# Patient Record
Sex: FEMALE | Race: WHITE
[De-identification: names, ages, dates, MRNs, and addresses within clinical notes are randomized per-mention and may not be internally consistent; named-entity substitution may affect disease eponyms.]

---

## 2018-10-30 ENCOUNTER — HOSPITAL ENCOUNTER (OUTPATIENT)
Dept: HOSPITAL 92 - BICMAMMO | Age: 68
Discharge: HOME | End: 2018-10-30
Attending: FAMILY MEDICINE
Payer: MEDICARE

## 2018-10-30 DIAGNOSIS — R92.8: Primary | ICD-10-CM

## 2018-10-30 DIAGNOSIS — Z80.3: ICD-10-CM

## 2018-10-30 PROCEDURE — 77066 DX MAMMO INCL CAD BI: CPT

## 2018-10-30 PROCEDURE — 76642 ULTRASOUND BREAST LIMITED: CPT

## 2018-10-30 PROCEDURE — G0279 TOMOSYNTHESIS, MAMMO: HCPCS

## 2018-10-30 NOTE — ULT
LIMITED LEFT BREAST ULTRASOUND:

 

Date:  10/30/18 

 

PROVIDED CLINICAL HISTORY:   

Abnormal mammogram. 

 

FINDINGS:

Limited sonographic interrogation was performed of the left breast in the region of mammographic conc
kristian. Multiple simples cysts are seen in addition to occasional debris-filled cysts. At the 9 o'clock 
position of the left breast, far posteriorly in the region of the chest wall, is a poorly defined, hy
poechoic mass with associated shadowing measuring about 1.0 cm. This is felt to correspond to the [facility]
mogram finding. 

 

IMPRESSION: 

 

BIRADS 5:  Highly Suggestive of Malignancy

 

Ultrasound guided biopsy is recommended. 

 

Results and recommendations discussed with the patient, who voiced understanding. 

 

 

POS: DIANNA

## 2018-11-01 ENCOUNTER — HOSPITAL ENCOUNTER (OUTPATIENT)
Dept: HOSPITAL 92 - BICULT | Age: 68
End: 2018-11-01
Attending: FAMILY MEDICINE
Payer: MEDICARE

## 2018-11-01 DIAGNOSIS — C50.812: Primary | ICD-10-CM

## 2018-11-01 PROCEDURE — 76942 ECHO GUIDE FOR BIOPSY: CPT

## 2018-11-01 PROCEDURE — 88305 TISSUE EXAM BY PATHOLOGIST: CPT

## 2018-11-01 PROCEDURE — 0HBU3ZX EXCISION OF LEFT BREAST, PERCUTANEOUS APPROACH, DIAGNOSTIC: ICD-10-PCS | Performed by: RADIOLOGY

## 2018-11-01 PROCEDURE — 88341 IMHCHEM/IMCYTCHM EA ADD ANTB: CPT

## 2018-11-01 PROCEDURE — 19100 BX BREAST PERCUT W/O IMAGE: CPT

## 2018-11-01 PROCEDURE — 88342 IMHCHEM/IMCYTCHM 1ST ANTB: CPT

## 2018-11-01 PROCEDURE — 88361 TUMOR IMMUNOHISTOCHEM/COMPUT: CPT

## 2018-11-01 PROCEDURE — 88360 TUMOR IMMUNOHISTOCHEM/MANUAL: CPT

## 2018-11-01 NOTE — ULT
ULTRASOUND GUIDED LEFT BREAST BIOPSY:

 

INDICATION: 

Left breast lesion in 9 o'clock position.

 

COMPARISON: 

Diagnostic evaluation dated 10/30/2018.

 

TECHNIQUE: 

Informed consent was obtained.  Preprocedural ultrasound demonstrated the suspicious hypoechoic mass 
within the left breast 9 o'clock position.  The left breast was sterilely prepped and draped.  Buffer
ed 1% Lidocaine was administered to the overlying subcutaneous tissues.  Under ultrasound guidance, a
 14-gauge core biopsy needle was guided down to the level of the lesion.  Three separate core samples
 were obtained.  Following the third core sample, a biopsy clip was placed adjacent to the lesion.  P
ressure was held at the biopsy site until hemostasis was obtained.  The patient tolerated the procedu
re without difficulty.  The patient is to have followup left breast diagnostic mammograms to confirm 
clip placement.

 

IMPRESSION: 

BIRADS category 5 - highly suggestive of malignancy - status post ultrasound-guided core biopsy.  Maliha
iting pathology results.

 

POS: DIANNA

## 2019-01-07 ENCOUNTER — HOSPITAL ENCOUNTER (OUTPATIENT)
Dept: HOSPITAL 92 - LABBT | Age: 69
Discharge: HOME | End: 2019-01-07
Attending: SURGERY
Payer: MEDICARE

## 2019-01-07 DIAGNOSIS — C50.912: ICD-10-CM

## 2019-01-07 DIAGNOSIS — Z01.812: Primary | ICD-10-CM

## 2019-01-07 LAB
ANION GAP SERPL CALC-SCNC: 12 MMOL/L (ref 10–20)
BASOPHILS # BLD AUTO: 0.1 THOU/UL (ref 0–0.2)
BASOPHILS NFR BLD AUTO: 1.3 % (ref 0–1)
BUN SERPL-MCNC: 17 MG/DL (ref 9.8–20.1)
CALCIUM SERPL-MCNC: 9.5 MG/DL (ref 7.8–10.44)
CHLORIDE SERPL-SCNC: 107 MMOL/L (ref 98–107)
CO2 SERPL-SCNC: 24 MMOL/L (ref 23–31)
CREAT CL PREDICTED SERPL C-G-VRATE: 0 ML/MIN (ref 70–130)
EOSINOPHIL # BLD AUTO: 0.4 THOU/UL (ref 0–0.7)
EOSINOPHIL NFR BLD AUTO: 5.2 % (ref 0–10)
GLUCOSE SERPL-MCNC: 96 MG/DL (ref 80–115)
HGB BLD-MCNC: 13 G/DL (ref 12–16)
LYMPHOCYTES # BLD: 2.7 THOU/UL (ref 1.2–3.4)
LYMPHOCYTES NFR BLD AUTO: 38.3 % (ref 21–51)
MCH RBC QN AUTO: 30.1 PG (ref 27–31)
MCV RBC AUTO: 90.1 FL (ref 78–98)
MONOCYTES # BLD AUTO: 0.7 THOU/UL (ref 0.11–0.59)
MONOCYTES NFR BLD AUTO: 9.3 % (ref 0–10)
NEUTROPHILS # BLD AUTO: 3.2 THOU/UL (ref 1.4–6.5)
NEUTROPHILS NFR BLD AUTO: 45.9 % (ref 42–75)
PLATELET # BLD AUTO: 266 THOU/UL (ref 130–400)
POTASSIUM SERPL-SCNC: 4.4 MMOL/L (ref 3.5–5.1)
RBC # BLD AUTO: 4.33 MILL/UL (ref 4.2–5.4)
SODIUM SERPL-SCNC: 139 MMOL/L (ref 136–145)
WBC # BLD AUTO: 7.1 THOU/UL (ref 4.8–10.8)

## 2019-01-07 PROCEDURE — 85025 COMPLETE CBC W/AUTO DIFF WBC: CPT

## 2019-01-07 PROCEDURE — 80048 BASIC METABOLIC PNL TOTAL CA: CPT

## 2019-01-18 ENCOUNTER — HOSPITAL ENCOUNTER (OUTPATIENT)
Dept: HOSPITAL 92 - SDC | Age: 69
Discharge: HOME | End: 2019-01-18
Attending: SURGERY
Payer: MEDICARE

## 2019-01-18 VITALS — BODY MASS INDEX: 38.9 KG/M2

## 2019-01-18 DIAGNOSIS — Z17.0: ICD-10-CM

## 2019-01-18 DIAGNOSIS — Z79.899: ICD-10-CM

## 2019-01-18 DIAGNOSIS — Z79.82: ICD-10-CM

## 2019-01-18 DIAGNOSIS — N60.12: ICD-10-CM

## 2019-01-18 DIAGNOSIS — E78.00: ICD-10-CM

## 2019-01-18 DIAGNOSIS — C50.812: Primary | ICD-10-CM

## 2019-01-18 PROCEDURE — 88334 PATH CONSLTJ SURG CYTO XM EA: CPT

## 2019-01-18 PROCEDURE — A9541 TC99M SULFUR COLLOID: HCPCS

## 2019-01-18 PROCEDURE — 0HBU0ZZ EXCISION OF LEFT BREAST, OPEN APPROACH: ICD-10-PCS | Performed by: SURGERY

## 2019-01-18 PROCEDURE — 38900 IO MAP OF SENT LYMPH NODE: CPT

## 2019-01-18 PROCEDURE — 88333 PATH CONSLTJ SURG CYTO XM 1: CPT

## 2019-01-18 PROCEDURE — 19301 PARTIAL MASTECTOMY: CPT

## 2019-01-18 PROCEDURE — 88307 TISSUE EXAM BY PATHOLOGIST: CPT

## 2019-01-18 PROCEDURE — 76098 X-RAY EXAM SURGICAL SPECIMEN: CPT

## 2019-01-18 PROCEDURE — 78195 LYMPH SYSTEM IMAGING: CPT

## 2019-01-18 PROCEDURE — 88342 IMHCHEM/IMCYTCHM 1ST ANTB: CPT

## 2019-01-18 PROCEDURE — 07B60ZX EXCISION OF LEFT AXILLARY LYMPHATIC, OPEN APPROACH, DIAGNOSTIC: ICD-10-PCS | Performed by: SURGERY

## 2019-01-18 PROCEDURE — 38525 BIOPSY/REMOVAL LYMPH NODES: CPT

## 2019-01-21 NOTE — MMO
SURGICAL SPECIMEN:

Four images of the surgical specimen are submitted for interpretation.  The last image demonstrates a
 spiculated mass with associated biopsy clip.  Findings were conveyed to Dr. Quintana at the time of t
he presentation of the specimen radiograph.

 

POS: DIANNA

## 2019-01-22 NOTE — OP
DATE OF PROCEDURE:  01/18/2019



PROCEDURE PERFORMED:  Left breast ultrasound-guided excisional biopsy and 
sentinel

lymph node biopsy. 



PREOPERATIVE DIAGNOSIS:  Left breast cancer.



POSTOPERATIVE DIAGNOSIS:  Left breast cancer.



HISTORY OF PRESENT ILLNESS:  Ms. Walsh is a 68-year-old woman who has biopsy-
proven

invasive ductal carcinoma of the left breast.  She has decided to undergo 
lumpectomy

and sentinel lymph node biopsy. 



DESCRIPTION OF PROCEDURE:  After informed consent was obtained and appropriate

preoperative antibiotics were administered, the patient was taken to the 
operating

room, where she was placed in supine position and general anesthesia was

administered.  

Lymphazurin injected subdermally in the retroareolar area and the breast 
massaged

for 5 minutes.  She was prepped and draped in a standard sterile fashion and 
local anesthesia was infused through skin and subcutaneous tissues

at the lower edge of the hair-bearing skin of the left axilla and a skin 
incision

was made.  Dissection was carried down to the true axilla and the Neoprobe used 
to

examine the axillary contents.  An area of increased activity was identified 
and a

small normal-appearing lymph node encountered.  This was blue in color and was

dissected free and a target count obtained, which was 161.  This was sent as

sentinel lymph node #1.  An additional area of increased activity adjacent to 
the

first sentinel lymph node was identified.  This was dissected free and had a 
target

count of 69, but it was not blue in color.  This was sent as sentinel lymph 
node #2.

 A third area of increased count was identified.  A blue lymph node was 
dissected

free and had a target count of 134.  This was sent as sentinel lymph node #3.  
The

remainder of the axilla was carefully examined and no blue or active lymph 
nodes were

identified in the remainder of the axilla.  Hemostasis was verified and the 
wound

was closed with 3-0 subcutaneous and 4-0 subcuticular sutures.  Attention was 
then

turned to the left breast mass.  The hypoechoic mass was identified and a wire

placed under ultrasound guidance immediately adjacent to the mass.  Local 
anesthesia

was infused and a circumareolar incision made.  Dissection was carried down 
around

the wire; however, the wire was felt to displace and pull back.  Attempts were 
made to dissect out

beyond the original placement of the wire with a rim of normal tissue.

The specimen was excised and marked for orientation for pathology.  However,

specimen radiographs did not show the biopsy clip or mass within the specimen.  
The

wound was palpated and a nodular area felt in the posterior portion of the 
wound.

On ultrasound, this area appeared hypoechoic, so this was excised and sent as an

extended posterior margin.  The wound was then again examined and on ultrasound,

an additional hypoechoic area was identified in the posterior/superior area and

this was sent as an extended posterosuperior margin.  The clip was identified in

this posterosuperior specimen.  The wound was then carefully palpated.  No other

nodular or abnormal feeling areas were found and on ultrasound, no additional

hypoechoic or abnormal areas were seen.  The wound was irrigated and hemostasis

obtained using Bovie electrocautery.  Additional local anesthesia was infused

circumferentially for postoperative pain management.  The subcutaneous tissues 
were

reapproximated with 3-0 Monocryl suture and the skin was closed with 4-0 
Monocryl

suture.  Additional local anesthesia was infused into the biopsy cavity and

Dermabond dressings were placed at both areas.  The patient was extubated and 
taken

to Recovery in good condition. 



ESTIMATED BLOOD LOSS:  Minimal.



COMPLICATIONS:  There were no complications.



SPECIMEN:  [place] lymph nodes and left breast mass with extended posterior and

extended posterosuperior margin. 







Job ID:  [id number]



[facility], [place]

## 2019-07-12 ENCOUNTER — HOSPITAL ENCOUNTER (OUTPATIENT)
Dept: HOSPITAL 92 - SDC | Age: 69
Discharge: HOME | End: 2019-07-12
Attending: SURGERY
Payer: MEDICARE

## 2019-07-12 VITALS — BODY MASS INDEX: 40.6 KG/M2

## 2019-07-12 DIAGNOSIS — Z88.5: ICD-10-CM

## 2019-07-12 DIAGNOSIS — Z79.899: ICD-10-CM

## 2019-07-12 DIAGNOSIS — Z79.82: ICD-10-CM

## 2019-07-12 DIAGNOSIS — M96.843: Primary | ICD-10-CM

## 2019-07-12 LAB
ANION GAP SERPL CALC-SCNC: 11 MMOL/L (ref 10–20)
BASOPHILS # BLD AUTO: 0.1 THOU/UL (ref 0–0.2)
BASOPHILS NFR BLD AUTO: 1.3 % (ref 0–1)
BUN SERPL-MCNC: 14 MG/DL (ref 9.8–20.1)
CALCIUM SERPL-MCNC: 9.3 MG/DL (ref 7.8–10.44)
CHLORIDE SERPL-SCNC: 109 MMOL/L (ref 98–107)
CO2 SERPL-SCNC: 26 MMOL/L (ref 23–31)
CREAT CL PREDICTED SERPL C-G-VRATE: 107 ML/MIN (ref 70–130)
EOSINOPHIL # BLD AUTO: 0.4 THOU/UL (ref 0–0.7)
EOSINOPHIL NFR BLD AUTO: 7.2 % (ref 0–10)
GLUCOSE SERPL-MCNC: 106 MG/DL (ref 80–115)
HGB BLD-MCNC: 12.6 G/DL (ref 12–16)
LYMPHOCYTES # BLD: 1.6 THOU/UL (ref 1.2–3.4)
LYMPHOCYTES NFR BLD AUTO: 29.7 % (ref 21–51)
MCH RBC QN AUTO: 29.5 PG (ref 27–31)
MCV RBC AUTO: 91.2 FL (ref 78–98)
MONOCYTES # BLD AUTO: 0.5 THOU/UL (ref 0.11–0.59)
MONOCYTES NFR BLD AUTO: 8.7 % (ref 0–10)
NEUTROPHILS # BLD AUTO: 2.8 THOU/UL (ref 1.4–6.5)
NEUTROPHILS NFR BLD AUTO: 53 % (ref 42–75)
PLATELET # BLD AUTO: 232 THOU/UL (ref 130–400)
POTASSIUM SERPL-SCNC: 3.9 MMOL/L (ref 3.5–5.1)
RBC # BLD AUTO: 4.26 MILL/UL (ref 4.2–5.4)
SODIUM SERPL-SCNC: 142 MMOL/L (ref 136–145)
WBC # BLD AUTO: 5.3 THOU/UL (ref 4.8–10.8)

## 2019-07-12 PROCEDURE — 0J9630Z DRAINAGE OF CHEST SUBCUTANEOUS TISSUE AND FASCIA WITH DRAINAGE DEVICE, PERCUTANEOUS APPROACH: ICD-10-PCS | Performed by: SURGERY

## 2019-07-12 PROCEDURE — 36415 COLL VENOUS BLD VENIPUNCTURE: CPT

## 2019-07-12 PROCEDURE — S0028 INJECTION, FAMOTIDINE, 20 MG: HCPCS

## 2019-07-12 PROCEDURE — 85025 COMPLETE CBC W/AUTO DIFF WBC: CPT

## 2019-07-12 PROCEDURE — 80048 BASIC METABOLIC PNL TOTAL CA: CPT

## 2019-07-13 NOTE — PDOC.OP
Operative Note





- Operative Note


Operative Note: 





PROCEDURE: Incision and drainage and debridement of left breast seroma with 

imbrication of seroma cavity





SURGEON: Dea Quintana M.D.





DATE: 7/12/29th





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





POSTOPERATIVE DIAGNOSIS: Multiply recurrent left breast seroma





HISTORY: Patient is status post left breast lumpectomy and radiation therapy 

for breast cancer. She developed a symptomatic seroma which has been drained on 

multiple occasions but continues to recur. Operative drainage debridement 

imbrication was recommended.





PROCEDURE IN DETAIL: After informed consent was obtained and appropriate 

preoperative antibiotics administered the patient was taken to the operating 

room where she was placed in supine position and anesthesia was administered. 

She was prepped and draped in standard sterile fashion and local anesthesia 

infused the skin and subcutaneous tissues overlying the seroma. The previous 

lumpectomy incision was incised and the seroma completely drained. There was a 

chronic seroma cavity with serosal irritation of the lining. The lining was 

stripped using a curet and then abraded with electrocautery. During this 

process at least to the previously placed biopsy cavity clips were removed.  

Due to the stiffness of the chronic seroma cavity capsule, and the significant 

defect created in the breast with simple collapse of the cavity, the decision 

was made to mobilize flaps medially and laterally to allow imbrication of the 

cavity while maintaining a normal breast contour. The subcutaneous tissues were 

incised with electrocautery proximally and medially near the level of the skin 

and above the level of the pectoralis muscle. This allowed the sides of the 

cavity to come together without tension. Additional local anesthesia was 

infused circumferentially for postoperative pain management and hemostasis at 

the operative site was confirmed.. The cavity was then imbricated with a 

running absorbable V- lock suture with excellent approximation of the tissues. 

The subcutaneous tissues were then reapproximated with running absorbable 

suture and the skin was closed with a running subcuticular Monocryl 4-0 suture. 

Dermabond dressings were applied and once this was dry a fluffs compression 

dressing was placed and secured to the skin with tape. The patient was taken to 

recovery in good condition. Estimated blood loss is minimal. There were no 

complications. There were no specimens.

## 2019-11-01 ENCOUNTER — HOSPITAL ENCOUNTER (OUTPATIENT)
Dept: HOSPITAL 92 - BICMAMMO | Age: 69
Discharge: HOME | End: 2019-11-01
Attending: SURGERY
Payer: MEDICARE

## 2019-11-01 DIAGNOSIS — Z85.3: ICD-10-CM

## 2019-11-01 DIAGNOSIS — Z08: Primary | ICD-10-CM

## 2019-11-01 PROCEDURE — 76642 ULTRASOUND BREAST LIMITED: CPT

## 2019-11-01 PROCEDURE — G0279 TOMOSYNTHESIS, MAMMO: HCPCS

## 2019-11-01 PROCEDURE — 77066 DX MAMMO INCL CAD BI: CPT

## 2019-11-01 NOTE — ULT
LEFT BREAST ULTRASOUND:

 

Date:  11/01/19 

 

HISTORY:  

Abnormal mammogram. 

 

FINDINGS:

Sonographic evaluation of the left lower inner breast demonstrates a 5.6 cm irregular fluid collectio
n with internal mobile echoes and no flow, likely representing a hematoma/seroma with blood products 
from surgery in this region. 

 

IMPRESSION: 

BI-RADS Category 3 - Probably benign findings. Recommend follow-up left diagnostic mammogram and ultr
asound in 6 months. 

 

 

 

POS: OFF

## 2019-11-01 NOTE — MMO
Bilateral MAMMO Bilat Diag [facility].

 

CLINICAL HISTORY:

Patient is 69 years old and is seen for diagnostic exam. The patient has the

following family history of breast cancer:  maternal grandmother, malignant

(generic) and maternal aunt, malignant (generic).  The patient has a history of

malignant (generic) in the left breast in 2018. The patient has a history of

left Lumpectomy in January, 2019 - malignant, left Ultrasound Guided Core Biopsy

in 2018 - malignant and left needle biopsy in 2015 - benign.

 

VIEWS:

The views performed were:  bilateral craniocaudal with tomosynthesis; bilateral

mediolateral oblique with tomosynthesis; and bilateral mediolateral with

tomosynthesis.

 

FILMS COMPARED:

The present examination has been compared to prior imaging studies performed at

[facility] on 10/30/2018 and 11/01/2019, and at [facility] on 11/14/2017.

 

This study has been interpreted with the assistance of computer-aided detection.

 

MAMMOGRAM FINDINGS:

The breasts are heterogeneously dense, which could obscure a lesion on

mammography.

 

Finding 1:

 

Nodularity is stable in the right breast.

 

Finding 2:

 

There is a mass in the left lower inner breast which shows mobile internal

echoes on US and likely due to hematoma/seroma with blood products.

 

IMPRESSION:

FINDING 1:  FINDING IN THE RIGHT BREAST IS BENIGN.

 

FINDING 2:  FINDING IN THE LEFT BREAST IS PROBABLY BENIGN. FOLLOW-UP IN 6 MONTHS

IS RECOMMENDED.

 

THE RESULTS OF THIS EXAM WERE SENT TO THE PATIENT.

 

ACR BI-RADS Category 3 - Probably benign finding - short interval follow-up

suggested. [facility] will notify the patient of the need for additional

imaging services.

 

MAMMOGRAPHY NOTE:

 1. A negative mammogram report should not delay a biopsy if a dominant of

 clinically suspicious mass is present.

 2. Approximately 10% to 15% of breast cancers are not detected by

 mammography.

 3. Adenosis and dense breasts may obscure an underlying neoplasm.

 

 

Reported by: GIORGI NI MD

Electonically Signed: [id number]

## 2020-12-09 ENCOUNTER — HOSPITAL ENCOUNTER (OUTPATIENT)
Dept: HOSPITAL 92 - BICMAMMO | Age: 70
Discharge: HOME | End: 2020-12-09
Attending: SURGERY
Payer: MEDICARE

## 2020-12-09 DIAGNOSIS — Z85.3: ICD-10-CM

## 2020-12-09 DIAGNOSIS — Z08: Primary | ICD-10-CM

## 2020-12-09 PROCEDURE — G0279 TOMOSYNTHESIS, MAMMO: HCPCS

## 2020-12-09 PROCEDURE — 77066 DX MAMMO INCL CAD BI: CPT

## 2020-12-09 NOTE — MMO
Bilateral MAMMO Bilat Diag [facility]MAR.

 

CLINICAL HISTORY:

Patient is 70 years old and is seen for diagnostic exam. The patient has the

following family history of breast cancer:  maternal grandmother, malignant

(generic) and maternal aunt, malignant (generic).  The patient has a history of

malignant (generic) in the left breast in 2018. The patient has a history of

left Lumpectomy in January, 2019 - malignant, left Ultrasound Guided Core Biopsy

in 2018 - malignant and left needle biopsy in 2015 - benign.

 

VIEWS:

The views performed were:  .

 

FILMS COMPARED:

The present examination has been compared to prior imaging studies performed at

[facility] on 11/01/2019 and 05/28/2020.

 

This study has been interpreted with the assistance of computer-aided detection.

 

MAMMOGRAM FINDINGS:

The breasts are heterogeneously dense, which could obscure a lesion on

mammography.

 

Mass like density in the left breast consistent with post op seroma is stable.

 

In the right breast, there are no suspicious masses, calcifications or areas of

architectural distortion.

 

IMPRESSION:

FINDING IN THE LEFT BREAST IS PROBABLY BENIGN. FOLLOW-UP IN 6 MONTHS IS

RECOMMENDED.

 

THE RESULTS OF THIS EXAM WERE SENT TO THE PATIENT.

 

ACR BI-RADS Category 3 - Probably benign finding - short interval follow-up

suggested. [facility] will notify the patient of the need for additional

imaging services.

 

MAMMOGRAPHY NOTE:

 1. A negative mammogram report should not delay a biopsy if a dominant of

 clinically suspicious mass is present.

 2. Approximately 10% to 15% of breast cancers are not detected by

 mammography.

 3. Adenosis and dense breasts may obscure an underlying neoplasm.

 

 

Reported by: GIORGI NI MD

Electonically Signed: [id number]

## 2024-01-07 NOTE — NM
LYMPHOSCINTIGRAPHY LEFT BREAST:

 

HISTORY: 

Left breast cancer.

 

FINDINGS: 

After explaining the procedure and answering all questions, the periareolar skin of the left breast w
as carefully cleansed.  Sterile technique was then used to inject a total volume of 1 cc into the shara
mis at the periareolar 12 o'clock, 6 o'clock, 3 o'clock, and 9 o'clock positions of the left breast.

 

Injection sites were carefully massaged by the patient.  

 

Immediate imaging showed uptake of radiotracer at 2 foci of the axillary tail of the left breast.

 

The skin over the lymph nodes was marked.  The patient tolerated the procedure well and was transferr
ed to day surgery in good condition.

 

IMPRESSION: 

Technically successful lymphoscintigraphy left breast revealing 2 axillary tail lymph nodes.

 

POS: DIANNA
no